# Patient Record
Sex: FEMALE | Race: WHITE | NOT HISPANIC OR LATINO | URBAN - METROPOLITAN AREA
[De-identification: names, ages, dates, MRNs, and addresses within clinical notes are randomized per-mention and may not be internally consistent; named-entity substitution may affect disease eponyms.]

---

## 2024-05-29 ENCOUNTER — OFFICE VISIT (OUTPATIENT)
Dept: URGENT CARE | Facility: URGENT CARE | Age: 64
End: 2024-05-29
Payer: OTHER GOVERNMENT

## 2024-05-29 VITALS
WEIGHT: 173.8 LBS | TEMPERATURE: 97.9 F | DIASTOLIC BLOOD PRESSURE: 80 MMHG | HEART RATE: 68 BPM | RESPIRATION RATE: 12 BRPM | OXYGEN SATURATION: 99 % | SYSTOLIC BLOOD PRESSURE: 158 MMHG

## 2024-05-29 DIAGNOSIS — B37.0 THRUSH: Primary | ICD-10-CM

## 2024-05-29 PROCEDURE — 99203 OFFICE O/P NEW LOW 30 MIN: CPT | Performed by: PHYSICIAN ASSISTANT

## 2024-05-29 RX ORDER — HYDROXYZINE HYDROCHLORIDE 25 MG/1
25 TABLET, FILM COATED ORAL 3 TIMES DAILY PRN
COMMUNITY

## 2024-05-29 RX ORDER — CLOTRIMAZOLE 10 MG/1
10 LOZENGE ORAL
Qty: 70 LOZENGE | Refills: 0 | Status: SHIPPED | OUTPATIENT
Start: 2024-05-29 | End: 2024-06-12

## 2024-05-29 RX ORDER — PROPRANOLOL HYDROCHLORIDE 10 MG/1
10 TABLET ORAL 3 TIMES DAILY
COMMUNITY

## 2024-05-29 RX ORDER — BUSPIRONE HYDROCHLORIDE 10 MG/1
10 TABLET ORAL 3 TIMES DAILY
COMMUNITY

## 2024-05-29 RX ORDER — TRAZODONE HYDROCHLORIDE 50 MG/1
50 TABLET, FILM COATED ORAL AT BEDTIME
COMMUNITY

## 2024-05-29 RX ORDER — SERTRALINE HYDROCHLORIDE 100 MG/1
100 TABLET, FILM COATED ORAL DAILY
COMMUNITY

## 2024-05-29 RX ORDER — SPIRONOLACTONE AND HYDROCHLOROTHIAZIDE 25; 25 MG/1; MG/1
TABLET ORAL DAILY
COMMUNITY

## 2024-05-30 PROBLEM — C50.919 MALIGNANT NEOPLASM OF BREAST (H): Status: ACTIVE | Noted: 2024-05-30

## 2024-05-30 PROBLEM — I10 HYPERTENSION: Status: ACTIVE | Noted: 2024-05-30

## 2024-05-30 PROBLEM — E83.119 HEMOCHROMATOSIS: Status: ACTIVE | Noted: 2024-05-30

## 2024-05-30 PROBLEM — D69.6 DISORDER INVOLVING THROMBOCYTOPENIA (H): Status: ACTIVE | Noted: 2024-05-30

## 2024-05-30 PROBLEM — F41.9 ANXIETY: Status: ACTIVE | Noted: 2017-10-31

## 2024-05-30 PROBLEM — K74.69 OTHER CIRRHOSIS OF LIVER (H): Status: ACTIVE | Noted: 2024-05-30

## 2024-05-30 PROBLEM — F32.A DEPRESSION: Status: ACTIVE | Noted: 2024-05-30

## 2024-05-30 NOTE — PROGRESS NOTES
Patient presents with:  Mouth/Lip Problem: Patient her with concern of possible thrush. State it is hard to eat and talk. Started yesterday.   Patient mentions abdominal swelling that started today.     (B37.0) Thrush  (primary encounter diagnosis)  Comment:   Plan: clotrimazole (MYCELEX) 10 MG lozenge          Avoid spicy and acidic foods.      At the end of the encounter, I discussed results, diagnosis, medications. Discussed red flags for immediate return to clinic/ER, as well as indications for follow up if no improvement. Patient understood and agreed to plan. Patient was stable for discharge     If not improving or if condition worsens, follow up with your Primary Care Provider        SUBJECTIVE:   Josué Martinez is a 63 year old female who presents today with a painful tongue, onset yesterday.  Has had thrush in the past and these symptoms are classic for her.      Denies any fevers.      She and her  are here in clinic today.        No past medical history on file.      Current Outpatient Medications   Medication Sig Dispense Refill    Multiple Vitamins-Iron (DAILY-ALF/IRON/BETA-CAROTENE) TABS TAKE 1 TABLET BY MOUTH DAILY. (Patient not taking: Reported on 10/19/2020) 30 tablet 7     Social History     Tobacco Use    Smoking status: Never Smoker    Smokeless tobacco: Never Used   Substance Use Topics    Alcohol use: Not on file     Family History   Problem Relation Age of Onset    Diabetes Mother     Diabetes Father          ROS:    10 point ROS of systems including Constitutional, Eyes, Respiratory, Cardiovascular, Gastroenterology, Genitourinary, Integumentary, Muscularskeletal, Psychiatric ,neurological were all negative except for pertinent positives noted in my HPI       OBJECTIVE:  BP (!) 168/100 (BP Location: Other (Comment), Patient Position: Sitting, Cuff Size: Adult Regular)   Pulse 68   Temp 97.9  F (36.6  C) (Tympanic)   Resp 12   Wt 78.8 kg (173 lb 12.8 oz)   SpO2 99%   Physical  Exam:  GENERAL APPEARANCE: healthy, alert and no distress  OP: erythematous and dry tongue with scant white film  NECK: supple, nontender, no lymphadenopathy  SKIN: no suspicious lesions or rashes

## 2024-05-30 NOTE — PATIENT INSTRUCTIONS
(B37.0) Thrush  (primary encounter diagnosis)  Comment:   Plan: clotrimazole (MYCELEX) 10 MG lozenge          Avoid spicy and acidic foods.